# Patient Record
Sex: MALE | Race: WHITE | NOT HISPANIC OR LATINO | Employment: FULL TIME
[De-identification: names, ages, dates, MRNs, and addresses within clinical notes are randomized per-mention and may not be internally consistent; named-entity substitution may affect disease eponyms.]

---

## 2023-11-22 ENCOUNTER — TRANSCRIBE ORDERS (OUTPATIENT)
Dept: OTHER | Age: 58
End: 2023-11-22

## 2023-11-22 DIAGNOSIS — M54.41 ACUTE BILATERAL LOW BACK PAIN WITH RIGHT-SIDED SCIATICA: Primary | ICD-10-CM

## 2023-12-14 ENCOUNTER — THERAPY VISIT (OUTPATIENT)
Dept: PHYSICAL THERAPY | Facility: CLINIC | Age: 58
End: 2023-12-14
Attending: FAMILY MEDICINE
Payer: OTHER MISCELLANEOUS

## 2023-12-14 DIAGNOSIS — M54.41 ACUTE BILATERAL LOW BACK PAIN WITH RIGHT-SIDED SCIATICA: ICD-10-CM

## 2023-12-14 PROCEDURE — 97110 THERAPEUTIC EXERCISES: CPT | Mod: GP | Performed by: PHYSICAL THERAPIST

## 2023-12-14 PROCEDURE — 97140 MANUAL THERAPY 1/> REGIONS: CPT | Mod: GP | Performed by: PHYSICAL THERAPIST

## 2023-12-14 PROCEDURE — 97161 PT EVAL LOW COMPLEX 20 MIN: CPT | Mod: GP | Performed by: PHYSICAL THERAPIST

## 2023-12-14 NOTE — PROGRESS NOTES
PHYSICAL THERAPY EVALUATION  Type of Visit: Evaluation    See electronic medical record for Abuse and Falls Screening details.    Subjective       Presenting condition or subjective complaint: LBP from lifting and carrying a patient down stairs.  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    Pain assessment:  Patient reports onset of LBP 11/7/2023.  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  Was able to finish work shift, but pain increased throughout the day.  Reporting B LBP.  Pain is described as tightness in the morning and soreness with intermittent sharp pain toward the evening.  Has also experienced intermittent burning pain down the back of both legs to about mid thigh level.  Pain is currently 2-3/10 and increases to 5/10 in the evening.  Pain increases with bending, twisting, lifting, and sitting.  Walking can be painful or helpful.  Has been managing symptoms with rest, heat, and a muscle relaxant.  Is currently off work.     Objective   LUMBAR SPINE EVALUATION  INTEGUMENTARY (edema, incisions): WNL  POSTURE: Standing Posture: Good standing posture  Sitting Posture: Slouched sitting posture  GAIT:   Weightbearing Status: WBAT  Assistive Device(s): None  Gait Deviations: WNL  ROM:   (Degrees) Left AROM Left PROM  Right AROM Right PROM   Hip Flexion Equal B Equal B Equal B Equal B   Hip Extension        Hip Abduction       Hip Adduction       Hip Internal Rotation       Hip External Rotation       Knee Flexion       Knee Extension       Lumbar Side glide Equal B.  R SGIS inc L LBP    Lumbar Flexion Mod loss, reaches to just below knees.  Inc L LBP   Lumbar Extension Mod loss.  Tightness L LB       Work mechanical stresses:  Bending, lifting  Leisure mechanical stresses: Walking  Functional disability score (SAMIA/STarT Back):  32  VAS score (0-10): 2-3/10      ADDITIONAL HISTORY:  Present symptoms: 2-3/10 B LB tightness  Pain quality: Tightness  Paresthesia (yes/no):  No  Symptoms (improving/unchanging/worsening):  improving.   Symptoms commenced as a result of: Carrying a patient down steps   Condition occurred in the following environment:   Work     Symptoms at onset (back/thigh/leg): Back  Constant symptoms (back/thigh/leg): Back  Intermittent symptoms (back/thigh/leg): Post B thighs    Symptoms are made worse with the following: Always Bending, Sometimes Sitting, Sometimes Walking, and Time of day - Always AM   Symptoms are made better with the following: Other - Rest, Heat    Disturbed sleep (yes/no):  No, not related to LBP  Sleeping postures (prone/sup/side R/L): prone, SL    Previous episodes (0/1-5/6-10/11+): 0 Year of first episode: 2023    Previous history: No history of LBP  Previous treatments: None    Specific Questions:  Cough/Sneeze/Strain (pos/neg): neg  Bowel/Bladder (normal/abnormal): normal  Gait (normal/abnormal): normal  Medications (nil/NSAIDS/analg/steroids/anticoag/other):  Muscle relaxant  Medical allergies:  none  General health (excellent/good/fair/poor):  good  Pertinent medical history:  High blood pressure and Chron's disease  Imaging (None/Xray/MRI/Other):  none  Recent or major surgery (yes/no):  no  Night pain (yes/no): no  Accidents (yes/no): no  Unexplained weight loss (yes/no): no  Barriers at home: none  Other red flags: none    Postural Observation:   Sitting:  Slouched Change of posture: Better  Standing: Neutral   Lateral Shift: Nil.  Other Observations:     Neurological:  Motor Deficit:  MMT strong and equal B   Reflexes:    Sensory Deficit:       Neurodynamic tests:  (-)slump B, LBP inc with R SLR    Test Movements:   During: produces, abolishes, increases, decreases, no effect, centralizing, peripheralizing   After: better, worse, no better, no worse, no effect, centralized, peripheralized    Symptomatic response Mechanical response    During testing After testing Effect - increased ROM, decreased ROM, or key functional test No Effect   Pretest symptoms standin-3/10 B LBP tightness     Rep FIS         Rep EIS           Pretest symptoms lyin-3/10    During testing After testing Effect - increased ROM, decreased ROM, or key functional test No Effect   Rep HUMBLE Increases    Worse        Rep EIL Decreases    Better    Inc ease with FIS, abol LBP with R SL      If required, pretest symptoms:    During testing After testing Effect - increased ROM, decreased ROM, or key functional test No Effect   Rep SGIS - R       Rep SGIS - L           Provisional Classification: Derangement - Bilateral, symmetrical, symptoms above knee    Principle of Management:  Education:  Posture   Equipment provided:  None  Mechanical therapy (Y/N):  Y-assessing   Extension principle:  EIL      MYOTOMES:    Left Right   T12-L3 (Hip Flexion) 5 5   L2-4 (Quads)  5 5   L4 (Ankle DF) 5 5   L5 (Great Toe Ext)     S1 (Toe Raise)       NEURAL TENSION:    Left Right   SLR Negative  Positive   SLR with DF     Femoral Nerve     Slump Negative  Negative    Tim (Lumbar)     Tim (Thoracic)     Tim (Cervical)     Median     Ulnar     Radial        FLEXIBILITY: WNL  PALPATION:  Tender B lumbar paraspinals and quadratus lumborum    Assessment & Plan   CLINICAL IMPRESSIONS  Medical Diagnosis: Acute B LBP with R sciatica    Treatment Diagnosis: LBP   Impression/Assessment: Patient is a 58 year old male with  lumbar complaints.  The following significant findings have been identified: Pain, Decreased ROM/flexibility, and Decreased activity tolerance. These impairments interfere with their ability to perform work tasks and recreational activities as compared to previous level of function.     Clinical Decision Making (Complexity):  Clinical Presentation: Stable/Uncomplicated  Clinical Presentation Rationale: based on medical and personal factors listed in PT evaluation  Clinical Decision Making (Complexity): Low complexity    PLAN OF CARE  Treatment Interventions:  Interventions: Manual Therapy, Therapeutic Activity, Therapeutic Exercise, Self-Care/Home Management    Long Term Goals     PT Goal 1  Goal Identifier: Bending  Goal Description: Patient will be able to bend reaching to ankles with 0-1/10 pain.  Rationale: to maximize safety and independence with performance of ADLs and functional tasks;to maximize safety and independence within the home (for work activities)  Goal Progress: Patient reaches to just below knees with 3-5/10 pain.  Target Date: 02/08/24      Frequency of Treatment: 1x/week  Duration of Treatment: 8 weeks      Education Assessment:   Learner/Method: Patient;Listening;Demonstration    Risks and benefits of evaluation/treatment have been explained.   Patient/Family/caregiver agrees with Plan of Care.     Evaluation Time:     PT Eval, Low Complexity Minutes (53736): 20       Signing Clinician: Gayle So, PT

## 2023-12-28 ENCOUNTER — THERAPY VISIT (OUTPATIENT)
Dept: PHYSICAL THERAPY | Facility: CLINIC | Age: 58
End: 2023-12-28
Payer: OTHER MISCELLANEOUS

## 2023-12-28 DIAGNOSIS — M54.50 ACUTE BILATERAL LOW BACK PAIN WITHOUT SCIATICA: Primary | ICD-10-CM

## 2023-12-28 PROCEDURE — 97110 THERAPEUTIC EXERCISES: CPT | Mod: GP | Performed by: PHYSICAL THERAPIST

## 2023-12-28 PROCEDURE — 97140 MANUAL THERAPY 1/> REGIONS: CPT | Mod: GP | Performed by: PHYSICAL THERAPIST

## 2024-01-05 ENCOUNTER — THERAPY VISIT (OUTPATIENT)
Dept: PHYSICAL THERAPY | Facility: CLINIC | Age: 59
End: 2024-01-05
Payer: OTHER MISCELLANEOUS

## 2024-01-05 DIAGNOSIS — M54.50 ACUTE BILATERAL LOW BACK PAIN WITHOUT SCIATICA: Primary | ICD-10-CM

## 2024-01-05 PROCEDURE — 97140 MANUAL THERAPY 1/> REGIONS: CPT | Mod: GP | Performed by: PHYSICAL THERAPIST

## 2024-01-05 PROCEDURE — 97110 THERAPEUTIC EXERCISES: CPT | Mod: GP | Performed by: PHYSICAL THERAPIST

## 2024-01-09 ENCOUNTER — THERAPY VISIT (OUTPATIENT)
Dept: PHYSICAL THERAPY | Facility: CLINIC | Age: 59
End: 2024-01-09
Payer: OTHER MISCELLANEOUS

## 2024-01-09 DIAGNOSIS — M54.50 ACUTE BILATERAL LOW BACK PAIN WITHOUT SCIATICA: Primary | ICD-10-CM

## 2024-01-09 PROCEDURE — 97110 THERAPEUTIC EXERCISES: CPT | Mod: GP | Performed by: PHYSICAL THERAPIST

## 2024-01-09 PROCEDURE — 97140 MANUAL THERAPY 1/> REGIONS: CPT | Mod: GP | Performed by: PHYSICAL THERAPIST

## 2024-01-09 NOTE — PROGRESS NOTES
01/09/24 0500   Appointment Info   Signing clinician's name / credentials Kal Shoemaker DPT   Total/Authorized Visits 8   Visits Used 4   Medical Diagnosis Acute B LBP with R sciatica   PT Tx Diagnosis LBP   Progress Note/Certification   Onset of illness/injury or Date of Surgery 11/07/23   Therapy Frequency 1x/week   Predicted Duration 8 weeks       Present No   PT Goal 1   Goal Identifier Bending   Goal Description Patient will be able to bend reaching to ankles with 0-1/10 pain.   Rationale to maximize safety and independence with performance of ADLs and functional tasks;to maximize safety and independence within the home  (for work activities)   Goal Progress Patient reaches to just below knees with 2-3/10 pain.   Target Date 02/08/24   Subjective Report   Subjective Report pt reports his back is sore but he does feel like he is making improvement. in morning he does still feel stiff and that has not changed much. Hurting less throughout the day.   Objective Measures   Objective Measures Objective Measure 1;Objective Measure 2   Objective Measure 1   Objective Measure LROM   Details lumbar flexion hands to ankle pull in low back, ext WNL no pain, left SGIS WNL no pain, right SGIS mild limtiation with pull  on left   Objective Measure 2   Objective Measure SLR   Details R SLR inc LBP   Treatment Interventions (PT)   Interventions Manual Therapy;Therapeutic Procedure/Exercise   Therapeutic Procedure/Exercise   Therapeutic Procedures: strength, endurance, ROM, flexibillity minutes (83567) 28   PTRx Ther Proc 1 Prone Press Ups   PTRx Ther Proc 1 - Details 1x10- D: dec A: B inc ease with FIS abol LBP with R SLR   PTRx Ther Proc 2 Repeated Extension in Lying with Lock/Sag   PTRx Ther Proc 2 - Details x 10 reps educated on o/p as well    Skilled Intervention better. continue with ext principals and give time so added repex for repeated ext.   Patient Response/Progress overall improving less  pain during day but still dealing with soreness in morning.   Ther Proc 1 repex 40 cycles 17 deg ext 2.0 speed 4 sec hold no flexion.   Manual Therapy   Manual Therapy: Mobilization, MFR, MLD, friction massage minutes (17212) 10   Manual Therapy Manual Therapy 2   Manual Therapy 1 PA mobilization Grade III L1-L5 pt prone   Manual Therapy 1 - Details x 8 min for extension progression / mobility   Manual Therapy 2 left lumbar erector spinae STM x 10 min pt prone   Skilled Intervention manual mobilization , STM for pain relief.   Patient Response/Progress improved flexion hands to ankle no pain after.   Education   Learner/Method Patient;Listening;Demonstration   Plan   Home program progression of ext, no change given flexion options and began light strengthening for remodelig.   Plan for next session still doing better with ext overall but plateaued so can trial left SGIS see how that went, if worse was instructed to trial flex and right SGIS. continue strenghtenning.   Total Session Time   Timed Code Treatment Minutes 38   Total Treatment Time (sum of timed and untimed services) 38         PLAN  Continue therapy per current plan of care.    Beginning/End Dates of Progress Note Reporting Period:    to 01/09/2024    Referring Provider:  Tonny Yanes

## 2024-01-25 ENCOUNTER — THERAPY VISIT (OUTPATIENT)
Dept: PHYSICAL THERAPY | Facility: CLINIC | Age: 59
End: 2024-01-25
Payer: OTHER MISCELLANEOUS

## 2024-01-25 DIAGNOSIS — M54.50 ACUTE BILATERAL LOW BACK PAIN WITHOUT SCIATICA: Primary | ICD-10-CM

## 2024-01-25 PROCEDURE — 97110 THERAPEUTIC EXERCISES: CPT | Mod: GP | Performed by: PHYSICAL THERAPIST

## 2024-01-25 PROCEDURE — 97140 MANUAL THERAPY 1/> REGIONS: CPT | Mod: GP | Performed by: PHYSICAL THERAPIST

## 2024-01-25 NOTE — PROGRESS NOTES
01/25/24 0500   Appointment Info   Signing clinician's name / credentials Kal Shoemaker DPT   Total/Authorized Visits 8   Visits Used 5   Medical Diagnosis Acute B LBP with R sciatica   PT Tx Diagnosis LBP   Progress Note/Certification   Onset of illness/injury or Date of Surgery 11/07/23   Therapy Frequency 1x/week   Predicted Duration 8 weeks       Present No   PT Goal 1   Goal Identifier Bending   Goal Description Patient will be able to bend reaching to ankles with 0-1/10 pain.   Rationale to maximize safety and independence with performance of ADLs and functional tasks;to maximize safety and independence within the home  (for work activities)   Goal Progress Patient reaches to just below knees with 1/10 pain.   Target Date 02/08/24   Date Met 01/25/24   Subjective Report   Subjective Report patient reports overall his back is feeling really good. Day 4 of no signficant pain and moving freely. does still have some tightness sensation. Still light Duty till 2/8.   Objective Measures   Objective Measures Objective Measure 1;Objective Measure 2   Objective Measure 1   Objective Measure LROM   Details lumbar flexion hands to ankle pull in low back, ext WNL no pain, left SGIS WNL no pain, right SGIS mild limtiation with pull  on left   Objective Measure 2   Objective Measure SLR   Details R SLR inc LBP   Treatment Interventions (PT)   Interventions Manual Therapy;Therapeutic Procedure/Exercise   Therapeutic Procedure/Exercise   Therapeutic Procedures: strength, endurance, ROM, flexibillity minutes (39016) 30   Ther Proc 1 repex 40 cycles 17 deg ext 2.0 speed 4 sec hold no flexion.   PTRx Ther Proc 1 Prone Press Ups   PTRx Ther Proc 1 - Details 1x10- D: dec A: B inc ease with FIS abol LBP with R SLR   PTRx Ther Proc 2 Repeated Extension in Lying with Lock/Sag   PTRx Ther Proc 2 - Details x 10 reps educated on o/p as well    PTRx Ther Proc 3 Spinal extensions   PTRx Ther Proc 3 - Details x 1  min    PTRx Ther Proc 4 Repeated Extension in Lying with Hips Shifted   PTRx Ther Proc 4 - Details No Notes   PTRx Ther Proc 5 Standing Sideglide   PTRx Ther Proc 5 - Details No Notes   PTRx Ther Proc 6 Double Knee to Chest   PTRx Ther Proc 6 - Details hide for now never made worse but because he is improving gradually with ext we are going to stay the course.    PTRx Ther Proc 7 Flexion in Sitting with Patient Overpressure/Progression to Knee Extension   PTRx Ther Proc 7 - Details hide for now never made worse but because he is improving gradually with ext we are going to stay the course.    PTRx Ther Proc 8 Prone Lumbar Extension #6 (Double Leg Raise)   PTRx Ther Proc 8 - Details 3 x10    PTRx Ther Proc 9 Prone Lumbar Extension #7 (Superman)   PTRx Ther Proc 9 - Details educated on progression.    Skilled Intervention doing better so continue with STM repex and progressed strengthening to improve pt strength and tolerance with lifting.   Patient Response/Progress overall improving less pain during day but still dealing with soreness in morning.   PTRx Ther Proc 10 Bridging #2A Weight Shift   PTRx Ther Proc 10 - Details 3 x 20    PTRx Ther Proc 11 Bridging #3 Leg Crossed   PTRx Ther Proc 11 - Details educated on progression    PTRx Ther Proc 12 Bridging #4 Bent leg   PTRx Ther Proc 12 - Details educated on progression    PTRx Ther Proc 13 Supine Abdominal Exercise #4 (Leg Extension)   PTRx Ther Proc 13 - Details 3 x 20    PTRx Ther Proc 14 Supine Abdominal Exercise #5 (Arm and Leg Extension)   PTRx Ther Proc 14 - Details educated on progression    PTRx Ther Proc 15 Supine Abdominal Exercise #6 (Dead Bug)   PTRx Ther Proc 15 - Details educated on progression    PTRx Ther Proc 16 Supine Abdominal Exercise #7A (Arm Extension with Legs at 90/90)   PTRx Ther Proc 16 - Details educated on progression    PTRx Ther Proc 17 Supine Abdominal Exercise #8 (Toe Taps)   PTRx Ther Proc 17 - Details educated on progression    PTRx  Ther Proc 18 Supine Abdominal Exercise #9A (Arm/Leg Extension With Feet Off the Floor)   PTRx Ther Proc 18 - Details educated on progression    PTRx Ther Proc 19 Supine Abdominal Exercise #10 (Bilateral Arm and Leg Extension)   PTRx Ther Proc 19 - Details educated on progression    Manual Therapy   Manual Therapy: Mobilization, MFR, MLD, friction massage minutes (90199) 8   Manual Therapy Manual Therapy 2   Manual Therapy 1 PA mobilization Grade III L1-L5 pt prone   Manual Therapy 1 - Details x 8 min for extension progression / mobility   Skilled Intervention manual mobilization , STM for pain relief.   Patient Response/Progress improved flexion hands to ankle no pain after.   Education   Learner/Method Patient;Listening;Demonstration   Plan   Home program progression of ext, no change given flexion options and began light strengthening for remodelig.   Updates to plan of care discharge   Plan for next session progress strengthening as able.   Total Session Time   Timed Code Treatment Minutes 38   Total Treatment Time (sum of timed and untimed services) 38       DISCHARGE  Reason for Discharge: Patient has met all goals.    Equipment Issued:     Discharge Plan: Patient to continue home program.    Referring Provider:  Tonny Yanes